# Patient Record
Sex: MALE | Race: WHITE | HISPANIC OR LATINO | Employment: FULL TIME | URBAN - NONMETROPOLITAN AREA
[De-identification: names, ages, dates, MRNs, and addresses within clinical notes are randomized per-mention and may not be internally consistent; named-entity substitution may affect disease eponyms.]

---

## 2024-07-31 ENCOUNTER — NON-PROVIDER VISIT (OUTPATIENT)
Dept: URGENT CARE | Facility: PHYSICIAN GROUP | Age: 24
End: 2024-07-31
Payer: COMMERCIAL

## 2024-07-31 ENCOUNTER — NON-PROVIDER VISIT (OUTPATIENT)
Dept: OCCUPATIONAL MEDICINE | Facility: CLINIC | Age: 24
End: 2024-07-31
Payer: COMMERCIAL

## 2024-07-31 ENCOUNTER — OCCUPATIONAL MEDICINE (OUTPATIENT)
Dept: URGENT CARE | Facility: PHYSICIAN GROUP | Age: 24
End: 2024-07-31
Payer: COMMERCIAL

## 2024-07-31 VITALS
TEMPERATURE: 97.8 F | SYSTOLIC BLOOD PRESSURE: 126 MMHG | DIASTOLIC BLOOD PRESSURE: 82 MMHG | HEIGHT: 69 IN | WEIGHT: 265 LBS | OXYGEN SATURATION: 97 % | BODY MASS INDEX: 39.25 KG/M2 | RESPIRATION RATE: 16 BRPM | HEART RATE: 74 BPM
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
## 2024-07-31 DIAGNOSIS — Z02.83 ENCOUNTER FOR DRUG SCREENING: ICD-10-CM

## 2024-07-31 DIAGNOSIS — S61.412A LACERATION OF LEFT HAND WITHOUT FOREIGN BODY, INITIAL ENCOUNTER: ICD-10-CM

## 2024-07-31 LAB
BREATH ALCOHOL COMMENT: NORMAL
POC BREATHALIZER: 0 PERCENT (ref 0–0.01)

## 2024-07-31 PROCEDURE — 90715 TDAP VACCINE 7 YRS/> IM: CPT

## 2024-07-31 PROCEDURE — 90471 IMMUNIZATION ADMIN: CPT

## 2024-07-31 PROCEDURE — 8911 PR MRO FEE: Performed by: NURSE PRACTITIONER

## 2024-07-31 PROCEDURE — 3074F SYST BP LT 130 MM HG: CPT

## 2024-07-31 PROCEDURE — 3079F DIAST BP 80-89 MM HG: CPT

## 2024-07-31 PROCEDURE — 12001 RPR S/N/AX/GEN/TRNK 2.5CM/<: CPT | Mod: LT

## 2024-07-31 RX ORDER — FEXOFENADINE HCL 60 MG/1
TABLET, FILM COATED ORAL
COMMUNITY
End: 2024-07-31

## 2024-07-31 NOTE — PROGRESS NOTES
Subjective:   Giorgio Parrish is a very pleasant 23 y.o. male who presents for:    Chief Complaint   Patient presents with    Laceration    Hand Injury     Playing with a knife at work and cut himself        HPI:    DOI: 7/31/2024  MELONIE: Stab injury to the left hand    Laceration   Incident onset: three hours ago, the patient accidentally stabbed the top of his left hand with a clean knife. He washed the wound out with soap and water after the incident. Hemostasis was achieved with pressure. Size: 1.5. The pain is moderate. The pain has been Constant since onset. He reports no foreign bodies present. His tetanus status is unknown.     Denies previous injury to the affected hand.  No second job.    ROS:    Review of Systems   Skin:         Laceration on the volar aspect of the left hand   All other systems reviewed and are negative.      Medications:      No current outpatient medications on file.       Allergies:     Allergies   Allergen Reactions    Penicillins Unspecified     Was told to never have it but doesn't know the reaction       Problem List:     There is no problem list on file for this patient.      Surgical History:    No past surgical history on file.    Past Social Hx:     Social History     Socioeconomic History    Marital status: Single     Social Determinants of Health     Financial Resource Strain: Low Risk  (12/1/2022)    Received from [facility] and [place]    Overall Financial Resource Strain (CARDIA)     Difficulty of Paying Living Expenses: Not hard at all        Past Family Hx:      History reviewed. No pertinent family history.    Problem list, medications, and allergies reviewed by myself today in Epic.     Objective:     Vitals:    07/31/24 1232   BP: 126/82   Pulse: 74   Resp: 16   Temp: 36.6 °C (97.8 °F)   SpO2: 97%       Physical Exam  Vitals reviewed.   Constitutional:       Appearance: Normal appearance. He is normal weight.   Musculoskeletal:         Hands:       Comments: 1.5 cm superficial laceration overlying the volar aspect of the left hand.  No overlying erythema, induration, fluctuance, or drainage.  No tendon or foreign body involvement.  Full range of motion of all fingers in the left hand.  Sensation intact.  Greater than 2-second capillary refill and 2+ radial pulse.   Neurological:      Mental Status: He is alert.       Laceration Repair    Date/Time: 7/31/2024 12:50 PM    Performed by: AMIE Duncan  Authorized by: AMIE Duncan  Body area: upper extremity  Location details: left hand  Laceration length: 1.5 cm  Foreign bodies: no foreign bodies  Tendon involvement: none  Nerve involvement: none  Vascular damage: no    Sedation:  Patient sedated: no    Irrigation solution: saline  Irrigation method: tap  Amount of cleaning: standard  Debridement: none  Degree of undermining: none  Skin closure: glue  Approximation: close  Approximation difficulty: simple  Dressing: bandage.  Patient tolerance: patient tolerated the procedure well with no immediate complications  Assessment/Plan:     Diagnosis and associated orders:     1. Laceration of left hand without foreign body, initial encounter  - Tdap =>6yo IM    2. Work related injury          Comments/MDM:     See C4 and D39  Laceration repaired in clinic using Dermabond  Tetanus updated  Work restrictions in place: Advise no pushing, pulling, lifting, submerging the hand, fine motor movements  Signs of infection reviewed at length  Return to clinic in 7 days for reevaluation         All questions answered. Patient verbalized understanding and is in agreement with this plan of care.     If symptoms are worsening or not improving in 3-5 days, follow-up with PCP or return to UC. Differential diagnosis, natural history, and supportive care discussed. AVS handout given and reviewed with patient. Patient educated on red flags and when to seek treatment back in ED or UC.     I  personally reviewed prior external notes and test results pertinent to today's visit.  I have independently reviewed and interpreted all diagnostics ordered during this urgent care visit.     This dictation has been created using voice recognition software. The accuracy of the dictation is limited by the abilities of the software. I expect there may be some errors of grammar and possibly content. I made every attempt to manually correct the errors within my dictation. However, errors related to voice recognition software may still exist and should be interpreted within the appropriate context.    This note was electronically signed by AMIE Tolentino

## 2024-07-31 NOTE — LETTER
"    EMPLOYEE’S CLAIM FOR COMPENSATION/ REPORT OF INITIAL TREATMENT  FORM C-4  PLEASE TYPE OR PRINT    EMPLOYEE’S CLAIM - PROVIDE ALL INFORMATION REQUESTED   First Name                    TOBI Alvares                  Last Name  Shiavni Birthdate                    2000                Sex  Male Claim Number (Insurer’s Use Only)     Home Address  [address] [place] [address] Age  23 y.o. Height  1.753 m (5' 9\") Weight  120 kg (265 lb) Social Security Number     Located within [facility] Zip  [address] Telephone  [phone] (home)    Mailing Address  [address] [place] [address] Zip  [address] Primary Language Spoken  English    INSURER   THIRD-PARTY   [employer]   Employee's Occupation (Job Title) When Injury or Occupational Disease Occurred      Employer's Name/Company Name  [employer]  Telephone  [phone]    Office Mail Address (Number and Street)  [address]     Date of Injury (if applicable) 7/31/2024               Hours Injury (if applicable)  9:50 AM Date Employer Notified  7/31/2024 Last Day of Work after Injury or Occupational Disease  7/31/2024 Supervisor to Whom Injury Reported  [facility]   Address or Location of Accident (if applicable)  Work [1]   What were you doing at the time of accident? (if applicable)  sitting spinning blade    How did this injury or occupational disease occur? (Be specific and answer in detail. Use additional sheet if necessary)  I was purchasing knife, spun it a few times before putting it in pocket   If you believe that you have an occupational disease, when did you first have knowledge of the disability and its relationship to your employment?  N/A Witnesses to the Accident (if applicable)  None, on camera      Nature of Injury or Occupational Disease  Workers' Compensation  Part(s) of Body Injured or Affected  Hand (L) N/A N/A    I " CERTIFY THAT THE ABOVE IS TRUE AND CORRECT TO T HE BEST OF MY KNOWLEDGE AND THAT I HAVE PROVIDED THIS INFORMATION IN ORDER TO OBTAIN THE BENEFITS OF [place], OR [place]  I HEREBY AUTHORIZE ANY PHYSICIAN, CHIROPRACTOR, SURGEON, PRACTITIONER OR ANY OTHER PERSON, ANY HOSPITAL, INCLUDING [facility] OR [facility], ANY  MEDICAL SERVICE ORGANIZATION, ANY INSURANCE COMPANY, OR OTHER INSTITUTION OR ORGANIZATION TO RELEASE TO EACH OTHER, ANY MEDICAL OR OTHER INFORMATION, INCLUDING BENEFITS PAID OR PAYABLE, PERTINENT TO THIS INJURY OR DISEASE, EXCEPT INFORMATION RELATIVE TO DIAGNOSIS, TREATMENT AND/OR COUNSELING FOR AIDS, PSYCHOLOGICAL CONDITIONS, ALCOHOL OR CONTROLLED SUBSTANCES, FOR WHICH I MUST GIVE SPECIFIC AUTHORIZATION.  A PHOTOSTAT OF THIS AUTHORIZATION SHALL BE VALID AS THE ORIGINAL.     Date 07/31/2024   [facility] Employee’s Original or  *Electronic Signature   THIS REPORT MUST BE COMPLETED AND MAILED WITHIN 3 WORKING DAYS OF TREATMENT   [facility]    Name of Facility  [facility]   Date 7/31/2024 Diagnosis and Description of Injury or Occupational Disease  (S61.412A) Laceration of left hand without foreign body, initial encounter  (Y99.0) Work related injury  Diagnoses of Laceration of left hand without foreign body, initial encounter and Work related injury were pertinent to this visit. Is there evidence that the injured employee was under the influence of alcohol and/or another controlled substance at the time of accident?  [x]No  [] Yes (if yes, please explain)   Hour 12:29 PM     Treatment: Laceration repaired in clinic using Dermabond.  Advised light duty.  No pushing, pulling, lifting, fine motor movements of the left hand.  Hand needs to be kept clean and dry.  Tetanus updated in clinic.  Over-the-counter analgesics.  Ice and elevate the affected hand     Have  you advised the patient to remain off work five days or more?   [] Yes Indicate dates: From   To    [x] No      If no, is the injured employee capable of: [] full duty [x] modified duty                     If modified duty, specify any limitations / restrictions:  Per D39.  No pushing, pulling, lifting, submerging hand, fine motor movements of the left hand                                                                                                                                                                                                                                                                                                                                                                                                               X-Ray Findings:   Comments:Not applicable    From information given by the employee, together with medical evidence, can you directly connect this injury or occupational disease as job incurred?  []Yes   [] No Yes    Is additional medical care by a physician indicated? []Yes [] No  Yes    Do you know of any previous injury or disease contributing to this condition or occupational disease? []Yes [] No (Explain if yes)                          No   Date  7/31/2024 Print Health Care Provider’s Name  AMIE Duncan I certify that the employer’s copy of  this form was delivered to the employer on:   Address  [address] [place] [address] INSURER'S USE ONLY                       [facility]  [id number] Provider’s Tax ID Number  [id number]   Telephone  Dept: [phone]    Health Care Provider’s Original or Electronic Signature  e-JODY Lloyd Degree (MD,DO, DC,PA-C,APRN)  APRN  Choose (if applicable)      ORIGINAL - TREATING HEALTHCARE PROVIDER PAGE 2 - INSURER/TPA PAGE 3 - EMPLOYER PAGE 4 - EMPLOYEE             Form C-4 (rev.08/23)

## 2024-07-31 NOTE — LETTER
PHYSICIAN’S AND CHIROPRACTIC PHYSICIAN'S   PROGRESS REPORT CERTIFICATION OF DISABILITY Claim Number:     Social Security Number:    Patient’s Name:Giorgio Parrish Date of Injury: 7/31/2024   Employer: [employer] Name of O (if applicable)      Patient’s Job Description/Occupation:        Previous Injuries/Diseases/Surgeries Contributing to the Condition:  None      Diagnosis:  (S61.412A) Laceration of left hand without foreign body, initial encounter  (Y99.0) Work related injury      Related to the Industrial Injury? Yes     Explain:three hours ago, the patient accidentally stabbed the top of his left hand with a clean knife. He washed the wound out with soap and water after the incident. Hemostasis was achieved with pressure. Size: 1.5. The pain is moderate. The pain has been Constant since onset. He reports no foreign bodies present. His tetanus status is unknown. Denies previous injury to the affected hand.  No second job.      Objective Medical Findings:1.5 cm superficial laceration overlying the volar aspect of the left hand.  No overlying erythema, induration, fluctuance, or drainage.  No tendon or foreign body involvement.  Full range of motion of all fingers in the left hand.  Sensation intact.  Greater than 2-second capillary refill and 2+ radial pulse.        None - Discharged               Stable  Yes               Ratable  Yes       Generally Improved                 Condition Worsened               X  Condition Same  May Have Suffered a Permanent Disability  No     Treatment Plan:    Laceration repaired in clinic using Dermabond.  Advised light duty.  No pushing, pulling, lifting, fine motor movements of the left hand.  Hand needs to be kept clean and dry.  Tetanus updated in clinic.  Over-the-counter analgesics.  Ice and elevate the affected hand to decrease inflammation.        No Change in Therapy                 PT/OT Prescribed             X  Medication May be Used While  Working       Case Management                        PT/OT Discontinued    Consultation    Further Diagnostic Studies:                               Prescription(s)                             Released to FULL DUTY /No Restrictions on (Date):  From:      Certified TOTALLY TEMPORARILY DISABLED (Indicate Dates) From:   To:    X  Released to RES TRICTED/Modified Duty on (Date): From: 7/31/2024 To: 8/7/2024                                                               Restrictions Are:  Temporary     No Sitting                               No Standing               X  No Pulling               Other: Avoid fine motor movements of the left hand.  Keep left hand clean and dry due to Dermabond.    No Bending at Waist             No Stooping               X  No Lifting     X  No Carrying                            No Walking                Lifting Restricted to (lbs.):     X  No Pushing                           No Climbing                   No Reaching Above Shoulders   Date of Next Visit:  8/7/2024 Date of this Exam:7/31/2024 Physician/Chiropractic Physician Name:AMIE Duncan Physician/Chiropractic Physician Signature:  Rui Madrid DO MPH

## 2024-08-07 ENCOUNTER — OCCUPATIONAL MEDICINE (OUTPATIENT)
Dept: URGENT CARE | Facility: PHYSICIAN GROUP | Age: 24
End: 2024-08-07
Payer: COMMERCIAL

## 2024-08-07 VITALS
DIASTOLIC BLOOD PRESSURE: 78 MMHG | OXYGEN SATURATION: 96 % | HEIGHT: 69 IN | BODY MASS INDEX: 39.69 KG/M2 | SYSTOLIC BLOOD PRESSURE: 124 MMHG | WEIGHT: 268 LBS | RESPIRATION RATE: 16 BRPM | TEMPERATURE: 96.9 F | HEART RATE: 85 BPM

## 2024-08-07 DIAGNOSIS — S61.412D LACERATION OF LEFT HAND WITHOUT FOREIGN BODY, SUBSEQUENT ENCOUNTER: ICD-10-CM

## 2024-08-07 PROCEDURE — 3074F SYST BP LT 130 MM HG: CPT

## 2024-08-07 PROCEDURE — 3078F DIAST BP <80 MM HG: CPT

## 2024-08-07 PROCEDURE — 99213 OFFICE O/P EST LOW 20 MIN: CPT

## 2024-08-07 NOTE — LETTER
PHYSICIAN’S AND CHIROPRACTIC PHYSICIAN'S   PROGRESS REPORT CERTIFICATION OF DISABILITY Claim Number:     Social Security Number:    Patient’s Name:Giorgio Parrish Date of Injury:7/31/2024   Employer: [employer] Name of O (if applicable)      Patient’s Job Description/Occupation:        Previous Injuries/Diseases/Surgeries Contributing to the Condition:  None       Diagnosis:  No diagnosis found.      Related to the Industrial Injury? Yes     Explain:Patient cut himself with a knife at work to the left hand       Objective Medical Findings:Healing laceration to the top of the left hand.  No redness, no swelling, no sensory loss.  Patient has full mobility.          None - Discharged                         Stable  Yes                 Ratable  No     X  Generally Improved                        Condition Worsened                 Condition Same  May Have Suffered a Permanent Disability  No     Treatment Plan:             No Change in Therapy                 PT/OT Prescribed                     Medication May be Used While Working       Case Management                        PT/OT Discontinued    Consultation    Further Diagnostic Studies:                               Prescription(s)                           X  Released to FULL DUTY /No Restrictions on (Date):  From: 8/7/2024    Certified TOTALLY TEMPORARILY DISABLED (Indicate Dates) From:   To:      Released to RESTRICTED/Modified Duty on (Date): From:   To:                                                                 Restrictions Are:  Temporary     No Sitting                               No Standing                   No Pulling                  Other:      No Bending at Waist           No Stooping                    No Lifting       No Carrying                           No Walking                Lifting Restricted to (lbs.):       No Pushing                            No Climbing                   No Reaching Above Shoulders   Date of  Next Visit:    Date of this Exam:8/7/2024 Physician/Chiropractic Physician Name:AMIE Keyes Physician/Chiropractic Physician Signature:  Rui Madrid DO MPH   D-39 (Rev. 2/24)

## 2024-08-07 NOTE — PROGRESS NOTES
"Subjective:     Giorgio Parrish is a 23 y.o. male who presents for Follow-Up ((L) hand lac, pt states he's doing good)    Cut the top of his left hand with a knife while at work, seen and treated here, this is his 1 week follow-up       PMH:   No pertinent past medical history to this problem  MEDS:  Medications were reviewed in EMR  ALLERGIES:  Allergies were reviewed in EMR  SOCHX:   FH:   No pertinent family history to this problem       Objective:     /78   Pulse 85   Temp 36.1 °C (96.9 °F) (Temporal)   Resp 16   Ht 1.753 m (5' 9\")   Wt 122 kg (268 lb)   SpO2 96%   BMI 39.58 kg/m²     Healing laceration to the top of the left hand.  No redness, no swelling, no sensory loss.  Patient has full mobility.      Assessment/Plan:   Patient's laceration appears to be fully healed, follow-up as needed    1. Laceration of left hand without foreign body, subsequent encounter      FROM   TO            Differential diagnosis, natural history, supportive care, and indications for immediate follow-up discussed.    "

## 2024-08-20 ENCOUNTER — OFFICE VISIT (OUTPATIENT)
Dept: URGENT CARE | Facility: PHYSICIAN GROUP | Age: 24
End: 2024-08-20
Payer: COMMERCIAL

## 2024-08-20 VITALS
OXYGEN SATURATION: 98 % | WEIGHT: 269 LBS | DIASTOLIC BLOOD PRESSURE: 68 MMHG | RESPIRATION RATE: 18 BRPM | SYSTOLIC BLOOD PRESSURE: 126 MMHG | BODY MASS INDEX: 39.84 KG/M2 | HEART RATE: 72 BPM | TEMPERATURE: 96.4 F | HEIGHT: 69 IN

## 2024-08-20 DIAGNOSIS — G43.809 OTHER MIGRAINE WITHOUT STATUS MIGRAINOSUS, NOT INTRACTABLE: ICD-10-CM

## 2024-08-20 PROCEDURE — 99214 OFFICE O/P EST MOD 30 MIN: CPT | Performed by: NURSE PRACTITIONER

## 2024-08-20 PROCEDURE — 3074F SYST BP LT 130 MM HG: CPT | Performed by: NURSE PRACTITIONER

## 2024-08-20 PROCEDURE — 3078F DIAST BP <80 MM HG: CPT | Performed by: NURSE PRACTITIONER

## 2024-08-20 RX ORDER — ZOLMITRIPTAN 5 MG/1
5 TABLET, FILM COATED ORAL PRN
Qty: 10 TABLET | Refills: 3 | Status: SHIPPED | OUTPATIENT
Start: 2024-08-20 | End: 2024-08-20

## 2024-08-20 RX ORDER — ZOLMITRIPTAN 5 MG/1
5 TABLET, FILM COATED ORAL PRN
Qty: 10 TABLET | Refills: 3 | Status: SHIPPED | OUTPATIENT
Start: 2024-08-20 | End: 2024-08-29

## 2024-08-20 RX ORDER — KETOROLAC TROMETHAMINE 30 MG/ML
30 INJECTION, SOLUTION INTRAMUSCULAR; INTRAVENOUS ONCE
Status: COMPLETED | OUTPATIENT
Start: 2024-08-20 | End: 2024-08-20

## 2024-08-20 RX ORDER — PROPRANOLOL HCL 60 MG
60 CAPSULE, EXTENDED RELEASE 24HR ORAL DAILY
Qty: 30 CAPSULE | Refills: 11 | Status: SHIPPED | OUTPATIENT
Start: 2024-08-20 | End: 2024-08-29

## 2024-08-20 RX ADMIN — KETOROLAC TROMETHAMINE 30 MG: 30 INJECTION, SOLUTION INTRAMUSCULAR; INTRAVENOUS at 15:16

## 2024-08-23 NOTE — PROGRESS NOTES
Verbal consent was acquired by the patient to use [employer] ambient listening note generation during this visit.    Subjective:     HPI:   History of Present Illness  The patient presents for evaluation of chronic migraines.    He experiences migraines approximately twice a week, often preceded by vision loss, numbness, nausea, and memory loss. These symptoms typically occur 30 minutes before the onset of a migraine. He has been suffering from persistent headaches for the past 9 years, which have recently intensified to the point of causing suicidal thoughts. His roommate has had to remove sharp objects from their shared living space for his safety. During severe migraines, he exhibits unusual behavior such as attempting to dig into the carpet.    Previous consultations with neurologists have suggested potential diagnoses including chronic migraines, tumors, or seizures. A brain scan conducted when he was 16 or 17 years old showed no abnormalities. He has not consulted a neurologist in the past 9 years.    He has tried various medications for pain relief, including Vicodin, Tylenol, and Advil, but none have provided significant relief. Maxalt, taken during an aura, only delayed the onset of the migraine. He has also tried sumatriptan and another similar medication, but neither were effective. He has received Toradol injections in the past, which provided some relief. He has tried magnesium supplements but not gabapentin.    He maintains hydration by drinking water equivalent to his body weight in ounces and ensures adequate salt intake due to a known salt deficiency. He has identified several triggers for his migraines, including the reflection from windows while driving, nitrates, overthinking, and rapid temperature changes. Despite his high pain tolerance, he finds reading and talking difficult due to the vibrations they cause in his head.          Objective:     Exam:  /68   Pulse 72   Temp (!) 35.8 °C  "(96.4 °F) (Temporal)   Resp 18   Ht 1.753 m (5' 9\")   Wt 122 kg (269 lb)   SpO2 98%   BMI 39.72 kg/m²  Body mass index is 39.72 kg/m².    Physical Exam  Vitals and nursing note reviewed.   Constitutional:       General: He is not in acute distress.     Appearance: Normal appearance. He is not ill-appearing.   HENT:      Head: Normocephalic and atraumatic.      Nose: Nose normal.   Cardiovascular:      Rate and Rhythm: Normal rate.      Pulses: Normal pulses.   Pulmonary:      Effort: Pulmonary effort is normal.   Musculoskeletal:      Cervical back: Normal range of motion.   Neurological:      General: No focal deficit present.      Mental Status: He is alert and oriented to person, place, and time. Mental status is at baseline.      GCS: GCS eye subscore is 4. GCS verbal subscore is 5. GCS motor subscore is 6.      Cranial Nerves: Cranial nerves 2-12 are intact.      Sensory: Sensation is intact.      Motor: Motor function is intact.      Coordination: Coordination is intact.      Gait: Gait is intact.   Psychiatric:         Attention and Perception: Attention and perception normal.         Mood and Affect: Mood is depressed. Affect is flat.         Speech: Speech normal.         Behavior: Behavior normal. Behavior is cooperative.         Thought Content: Thought content normal.         Cognition and Memory: Cognition normal.         Judgment: Judgment normal.           Assessment & Plan:     1. Other migraine without status migrainosus, not intractable  propranolol LA (INDERAL LA) 60 MG CAPSULE SR 24 HR    Referral to Neurology    ketorolac (Toradol) injection 30 mg    zolmitriptan (ZOMIG) 5 MG tablet    DISCONTINUED: zolmitriptan (ZOMIG) 5 MG tablet          Assessment & Plan  1. Chronic migraines.  The patient's symptoms and medical history are indicative of chronic migraines. He experiences migraines approximately twice a week, with symptoms including vision loss, numbness, nausea, and memory loss. He has " had a constant headache for the past 9 years, which has worsened over time. Previous treatments, including Maxalt, sumatriptan, and Zomig, have been ineffective. A referral to a neurologist has been made for further evaluation and treatment. A prescription for propranolol, to be taken once daily, has been provided. A Toradol injection will be administered today to help alleviate the current headache. He has been advised to activate his [employer] [facility] [employer] account for easy communication regarding medication effectiveness and potential side effects. If the current treatment plan does not alleviate symptoms, alternative medications such as Topamax or Nurtec may be considered. The potential for Botox injections and other injectable preventive medicines was discussed.    2. Suicidal ideation.  He expressed suicidal thoughts due to the severity of his migraines. He reports he does not have an active plan, he just feels that he cannot take the pain of these headaches for more than a few more months and is seeking help.  He has been advised to seek immediate help if these thoughts persist or worsen. The importance of mental health support was emphasized, and he was encouraged to communicate openly about his feelings.  Patient is not actively suicidal at this time, but he does report that he has been in the past.  Discussed the importance of remaining patient with the process of trial of medications and diagnostic workups so that he may get the help he needs.  Patient verbalized understanding and agrees that he will remain safe.                  Sugar Napier, A.P.R.N.    My total time spent caring for the patient on the day of the encounter was 45 minutes.   This does not include time spent on separately billable procedures/tests.    Please note that this dictation was created using voice recognition software. I have made every reasonable attempt to correct obvious errors, but I expect that there are errors of grammar and possibly  content that I did not discover before finalizing the note.

## 2024-08-29 ENCOUNTER — OFFICE VISIT (OUTPATIENT)
Dept: NEUROLOGY | Facility: MEDICAL CENTER | Age: 24
End: 2024-08-29
Attending: PSYCHIATRY & NEUROLOGY
Payer: COMMERCIAL

## 2024-08-29 VITALS
DIASTOLIC BLOOD PRESSURE: 74 MMHG | OXYGEN SATURATION: 97 % | SYSTOLIC BLOOD PRESSURE: 106 MMHG | BODY MASS INDEX: 40.13 KG/M2 | TEMPERATURE: 98.2 F | HEIGHT: 69 IN | RESPIRATION RATE: 16 BRPM | HEART RATE: 68 BPM | WEIGHT: 270.95 LBS

## 2024-08-29 DIAGNOSIS — G43.809 VESTIBULAR MIGRAINE: Primary | ICD-10-CM

## 2024-08-29 RX ORDER — KETOPROFEN 75 MG/1
CAPSULE ORAL
Qty: 60 CAPSULE | Refills: 6 | Status: SHIPPED | OUTPATIENT
Start: 2024-08-29 | End: 2024-08-30 | Stop reason: RX

## 2024-08-29 RX ORDER — TOPIRAMATE 25 MG/1
TABLET, FILM COATED ORAL
Qty: 120 TABLET | Refills: 1 | Status: SHIPPED | OUTPATIENT
Start: 2024-08-29

## 2024-08-29 RX ORDER — METOCLOPRAMIDE 10 MG/1
TABLET ORAL
Qty: 45 TABLET | Refills: 1 | Status: SHIPPED | OUTPATIENT
Start: 2024-08-29

## 2024-08-29 RX ORDER — ELETRIPTAN HYDROBROMIDE 40 MG/1
TABLET, FILM COATED ORAL
Qty: 9 TABLET | Refills: 4 | Status: SHIPPED | OUTPATIENT
Start: 2024-08-29

## 2024-08-29 ASSESSMENT — ENCOUNTER SYMPTOMS
PHOTOPHOBIA: 1
FOCAL WEAKNESS: 0
SENSORY CHANGE: 0
MEMORY LOSS: 1
LOSS OF CONSCIOUSNESS: 0
SPEECH CHANGE: 0
DEPRESSION: 0
HEADACHES: 1
INSOMNIA: 0

## 2024-08-29 ASSESSMENT — PATIENT HEALTH QUESTIONNAIRE - PHQ9
CLINICAL INTERPRETATION OF PHQ2 SCORE: 3
SUM OF ALL RESPONSES TO PHQ QUESTIONS 1-9: 5
5. POOR APPETITE OR OVEREATING: 0 - NOT AT ALL

## 2024-08-30 DIAGNOSIS — G43.809 VESTIBULAR MIGRAINE: ICD-10-CM

## 2024-08-30 RX ORDER — INDOMETHACIN 50 MG/1
CAPSULE ORAL
Qty: 45 CAPSULE | Refills: 5 | Status: SHIPPED | OUTPATIENT
Start: 2024-08-30

## 2024-08-30 NOTE — PROGRESS NOTES
"Merrill Parrish is a 23 y.o. male who presents from the office of CLAUDIA Landon, for consultation, with a history of persistent migraine with aura dating back almost 7 years.  Issues gotten from discussions with the patient but also review of a rather extensive history of medical records from years prior.    FRANK Alvares is a very pleasant 23-year-old right-handed gentleman whose headaches have been rather refractory since onset.  He describes a typical pattern.    Prodrome: None    Aura: Invariably he has an aura which starts as a central visual scotoma followed by numbness around the mouth and right arm, whole body tingling, vertigo with nausea, and eventually aphasia of receptive nature.  He will use strange words without recognizing it.  With the language deficit, the headache soon follows.  All of these symptoms resolved completely as the headache increases.    Headache: He describes a moderate daily headache which is not associated with aura.  This is more holocephalic, aching in quality, but he describes a \"sensitivity\" to his body, concentration can be curtailed, light sensitivity persists.  This is present 24/7.    The severe headache attacks always follow the aura, they are either left-sided and retro-orbital at outset or start \"inside the brain\" but then pushed forward from behind the eyes, left more than right.  The pain throbs, incapacitates, there is impaired concentration, heightened sensitivities, nausea and vomiting persist despite resolution of vertigo, he tries to avoid any type of movement.  The neck becomes pain and stiffen, he denies allodynia or autonomic symptoms.    Duration: Severe headaches last upwards of 3 days.    Onset: Anytime of day.    Start: He estimates that about 16 years of age she started having the headaches with aura.  He remembers having incapacitating headaches alone when he was about 13 years of age but never realize what they were.    Frequency: The " headaches have a pattern of about twice a week frequency but this last for upwards of 3-4 months at a time.  He can be free of severe attacks, even in isolation, for 1 or 2 months but then the cycle repeats itself.  This been the pattern since things started, the baseline headaches have been increasing between flares of the severe attacks.    Triggers: A cluster headaches start spontaneously, but during the cluster, strong odors, strong lights, and wine consumption make things worse.  Workup: Review of the records indicates that he has had quite the workup, admitted for what was suspected is a stroke.  CT of the brain, CTA of the brain, as well as MRI, echocardiography, EEG study, etc. have all been performed, and all have proved normal.    Treatment: High doses of Aleve, and then Imitrex and more recently Zomig have provided no benefit.  He was just placed on Inderal LA 60 mg and all he got was insomnia.  He has been on no other prescription treatments.    Medical history is remarkable for DIMA, though he has never required treatment.  There is no history of CVA, CAD, PVD, asthma, IBD, tremor, diabetes, hypertension, liver or kidney disease, glaucoma, kidney stones, MS, seizure, neurodegenerative disease or blood dyscrasia.    Though he has had 4 concussions in his lifetime, he denies any history of cranial or cervical spine surgery.    The family history is unknown, he has an adopted son, he knows of no blood relations.  He has no children.    He does not smoke, there is use of THC occasionally.  He rarely drinks alcohol.  He is a  and  at the [employer] in [place], [place].    He is on Inderal LA 60 mg daily and Zomig 5 mg tablets as needed.    Review of Systems   Constitutional:  Positive for malaise/fatigue.   Eyes:  Positive for photophobia.   Neurological:  Positive for headaches. Negative for sensory change, speech change, focal weakness and loss of consciousness.   Psychiatric/Behavioral:   "Positive for memory loss. Negative for depression. The patient does not have insomnia.    All other systems reviewed and are negative.    Objective     /74 (BP Location: Right arm, Patient Position: Sitting, BP Cuff Size: Adult)   Pulse 68   Temp 36.8 °C (98.2 °F) (Temporal)   Resp 16   Ht 1.753 m (5' 9\")   Wt 123 kg (270 lb 15.1 oz)   SpO2 97%   BMI 40.01 kg/m²      Physical Exam    He appears in no acute distress, though he is complaining of moderate headache with slight photophobia.  He is quite cooperative.  Vital signs are stable.  There is mild left supraorbital ridge tenderness, tenderness across the maxillary sinus as well as the left temporal region and jaw.  There is no jaw claudication or malar rash.  There is some tenderness across the occipital ridge on the left, occipital notch compression does not elicit pain.  There is bilateral trapezius and cervical paraspinal muscle tenderness, trigger point is identified over the right trapezius muscle body.  Spinous processes are nontender.  Cervical spine shows full range of motion, carotid pulses are present bilaterally without asymmetry or bruits.  Cardiac evaluation reveals a regular rhythm without murmur.  There is no evidence of rash or bruising, there is no lower extremity edema.     Neurological Exam    Fully oriented, there is no aphasia, apraxia, or inattention.    PERRLA/EOMI, visual fields are full to finger counting on confrontation bilaterally.  Funduscopic exam could not be done because of the photophobia and limited patient cooperation.  Facial movements are symmetric, sensory exam is intact to temperature and light touch bilaterally.  The tongue and uvula are midline, there is no bulbar dysfunction.  Jaw movements are intact, jaw jerk is absent.  Shoulder shrug and head rotation are symmetric.    Musculoskeletal exam reveals normal tone throughout, there is no tremor, asterixis, or drift.  Strength is 5/5 bilaterally.    Reflexes " are brisk and present throughout, there are no asymmetries, none are dropped.  Both toes are downgoing.    He stands easily, gait is normal and station and stride length, armswing is symmetric.  Heel, toe, and tandem walking are all normal.  There is no appendicular dystaxia.  Fine motor control and repetitive movements are normal in all 4 extremities.    Sensory exam is intact to temperature and vibration, Romberg is absent.    Assessment & Plan     Assessment & Plan  Vestibular migraine  Though the headaches have been labeled by different providers as different things, the association of vertigo with the visual changes and sensory distortions are more consistent with vestibular migraine.  Regardless, it is more of a semantic issue, the headaches are still migraine with a unique aura, and should be treated as such.  He has a less than 0.2% chance that these are symptomatic headaches given his clinical presentation and a normal neurologic examination.  The workup already has been completed, it does not need to be repeated.    Though his headaches fit criteria for chronic migraine (more than 15 days a month of headache pain, and of these, especially through a cluster, at least 15 days are associated with more than 4 hours of incapacity; this pattern ongoing for at least 3 months), we have treatment options available.  He does warrant maintenance as well as affective rescue.    Inderal will be stopped, it is providing no benefit in making it a real problem with insomnia.  Topamax is another FDA approved treatment for chronic migraine, he prefers pills over injections, though we did talk about Botox and the CGRP treatments.  Topamax 25 mg is started nightly, increase weekly by 25 mg up to 100 mg nightly.  Side effects were reviewed.  For rescue, Relpax 40 mg/ketoprofen 75 mg/Reglan 10 mg will be taken as a cocktail, 1 tablet each when the pain first begins, at least when the language deficits start since these are his  "immediate \"tell\" that the pain is about to begin.  This dose can then be repeated within an hour if needed, longer if the first dose works.  Side effects were reviewed.    We spent some time talking about his prognosis, treatment options available to them, etc. he was told that even the milder daily headaches and associated symptoms will improve once we find a good maintenance regimen.  We will follow-up in the next 4 months or so, communicate via Fingerprintt in the interim.    Orders:    ketoprofen (ORUDIS) 75 MG Cap; 1 tab at headache onset; repeat in 2-3 hour prn    metoclopramide (REGLAN) 10 MG Tab; 1-3 tab at headache/nausea onset; repeat in 4-6 hours prn    eletriptan (RELPAX) 40 MG tablet; 1 tab at headache onset; repeat in 1 hour prn    topiramate (TOPAMAX) 25 MG Tab; 1 tab qhs for 1 week, then 2 tab qhs for 1 week, then 3 tab qhs for 1 week, then 4 tab qhs    Time: 60 minutes in total spent on patient care including pre-charting, record review, discussion with healthcare staff and documentation.  This includes face-to-face time for exam, review, discussion, as well as counseling and coordinating care.            "

## 2024-09-28 DIAGNOSIS — G43.809 VESTIBULAR MIGRAINE: ICD-10-CM

## 2024-09-30 RX ORDER — TOPIRAMATE 100 MG/1
100 TABLET, FILM COATED ORAL EVERY EVENING
Qty: 30 TABLET | Refills: 5 | Status: SHIPPED | OUTPATIENT
Start: 2024-09-30

## 2024-09-30 NOTE — TELEPHONE ENCOUNTER
Notify the patient of the change in strength of the tablets.  These are 100 mg tablets, tell him it is the same dose he has been on, we are simply using a 100 mg tablet to make things easier.

## 2024-09-30 NOTE — TELEPHONE ENCOUNTER
Received request via: Pharmacy    Medication Name/Dosage Topamax     When was medication last prescribed 8/29/24    How many refills were previously provided 1     How many Refills does he patient have left from last prescription 0    Was the patient seen in the last year in this department? Yes   Date of last office visit 8/29/24     Per last Neurology Office Visit, when was the date of next follow up visit set for?                 4 mths            Date of office visit follow up request 12/29/24     Does the patient have an upcoming appointment? No   If yes, when              If no, schedule appointment LVM    Does the patient have long term Plus and need 100 day supply (blood pressure, diabetes and cholesterol meds only)? Medication is not for blood pressure,diabetes, or cholesterol       PT IS TAKING 4 TABLETS A DAY

## 2024-12-03 DIAGNOSIS — G43.809 VESTIBULAR MIGRAINE: ICD-10-CM

## 2024-12-03 RX ORDER — TOPIRAMATE 25 MG/1
25 TABLET, FILM COATED ORAL EVERY EVENING
Qty: 30 TABLET | Refills: 5 | Status: SHIPPED | OUTPATIENT
Start: 2024-12-03

## 2025-05-24 DIAGNOSIS — G43.809 VESTIBULAR MIGRAINE: ICD-10-CM

## 2025-05-29 RX ORDER — TOPIRAMATE 25 MG/1
25 TABLET, FILM COATED ORAL EVERY EVENING
Qty: 90 TABLET | Refills: 3 | Status: SHIPPED | OUTPATIENT
Start: 2025-05-29

## 2025-05-29 RX ORDER — TOPIRAMATE 100 MG/1
100 TABLET, FILM COATED ORAL EVERY EVENING
Qty: 90 TABLET | Refills: 3 | Status: SHIPPED | OUTPATIENT
Start: 2025-05-29

## 2025-05-29 NOTE — TELEPHONE ENCOUNTER
Received request via: Patient    Medication Name/Dosage Topamax 100 + 25 MG     When was medication last prescribed 9/30/24 & 12/3/24    How many refills were previously provided 5- For both     How many Refills does he patient have left from last prescription 0    Was the patient seen in the last year in this department? Yes   Date of last office visit 8/29/24     Per last Neurology Office Visit, when was the date of next follow up visit set for?                          4 mths   Date of office visit follow up request 12/29/24     Does the patient have an upcoming appointment? No   If yes, when              If no, schedule appointment Will call pt and see if they got new insurance     Does the patient have [facility] and need 100 day supply (blood pressure, diabetes and cholesterol meds only)? Medication is not for blood pressure,diabetes,or cholesterol